# Patient Record
Sex: FEMALE | URBAN - METROPOLITAN AREA
[De-identification: names, ages, dates, MRNs, and addresses within clinical notes are randomized per-mention and may not be internally consistent; named-entity substitution may affect disease eponyms.]

---

## 2023-09-26 ENCOUNTER — APPOINTMENT (RX ONLY)
Dept: URBAN - METROPOLITAN AREA CLINIC 23 | Facility: CLINIC | Age: 38
Setting detail: DERMATOLOGY
End: 2023-09-26

## 2023-09-26 DIAGNOSIS — Z41.9 ENCOUNTER FOR PROCEDURE FOR PURPOSES OTHER THAN REMEDYING HEALTH STATE, UNSPECIFIED: ICD-10-CM

## 2023-09-26 PROCEDURE — ? SKIN CARE REGIMEN

## 2023-09-26 PROCEDURE — ? RECOMMENDATIONS

## 2023-09-26 NOTE — PROCEDURE: RECOMMENDATIONS
Detail Level: Zone
Recommendations (Free Text): Vbeam full face : 5 sessions every 4 weeks $500 per session \\nPico laser: 3-5 sessions, $700 per session \\nFrax duo laser: 3 sessions , $1500 per session\\nBotox 3 areas: $950\\nBotox trapezius: $950
Render Risk Assessment In Note?: no

## 2023-09-26 NOTE — HPI: OTHER
Condition:: Cosmetic consultation
Please Describe Your Condition:: Pt states her target areas are her face and shoulder muscles. Pt states that she is concerned about her capillaries in face. Pt states she previously finished accutane.

## 2023-09-26 NOTE — PROCEDURE: SKIN CARE REGIMEN
Recommendation Preamble: The following skin care regimen instructions were provided to the patient:
Detail Level: Zone
Recommendations (Free Text): Morning : antioxidant like vitamin C, moisturizer, and sunscreen reapplied every 2 hours\\n\\nNight: Wash face with gentle  and alternate between tretinoin with moisturizer and glycolic acid and moisturizer.